# Patient Record
Sex: FEMALE | Race: WHITE | Employment: UNEMPLOYED | URBAN - METROPOLITAN AREA
[De-identification: names, ages, dates, MRNs, and addresses within clinical notes are randomized per-mention and may not be internally consistent; named-entity substitution may affect disease eponyms.]

---

## 2017-02-02 ENCOUNTER — HOSPITAL ENCOUNTER (OUTPATIENT)
Dept: WOUND CARE | Age: 62
Discharge: HOME | End: 2017-02-02
Payer: COMMERCIAL

## 2017-02-02 VITALS
RESPIRATION RATE: 16 BRPM | OXYGEN SATURATION: 97 % | TEMPERATURE: 97 F | HEART RATE: 94 BPM | SYSTOLIC BLOOD PRESSURE: 159 MMHG | DIASTOLIC BLOOD PRESSURE: 87 MMHG

## 2017-02-02 PROBLEM — L97.512 SKIN ULCER OF SECOND TOE OF RIGHT FOOT WITH FAT LAYER EXPOSED (HCC): Status: ACTIVE | Noted: 2017-02-02

## 2017-02-02 PROBLEM — M20.5X1 MALLET TOE OF RIGHT FOOT: Status: ACTIVE | Noted: 2017-02-02

## 2017-02-02 PROBLEM — E11.42 DIABETIC SENSORIMOTOR NEUROPATHY (HCC): Status: ACTIVE | Noted: 2017-02-02

## 2017-02-02 PROCEDURE — 77030011256 HC DRSG MEPILEX <16IN NO BORD MOLN -A: Performed by: PODIATRIST

## 2017-02-02 PROCEDURE — 77030020057 HC DRSG MTRX CLLGN STGN -B: Performed by: PODIATRIST

## 2017-02-02 PROCEDURE — 97597 DBRDMT OPN WND 1ST 20 CM/<: CPT

## 2017-02-02 RX ORDER — CETIRIZINE HCL 10 MG
5 TABLET ORAL
COMMUNITY

## 2017-02-02 NOTE — PROGRESS NOTES
Podiatry Consultation Note    Assessment:       Patient Active Problem List   Diagnosis Code    Skin ulcer of second toe of right foot with fat layer exposed ([facility] [address] L97.512    Diabetic sensorimotor neuropathy ([facility] [address] E11.42    Mallet toe of right foot M20.41       Plan:     Selective excisional debridement with [address] with Leticia and polymem to the distal 2nd toe right. Explained to patient once healed she will benefit from surgical intervention to correct the deformity. Patient seems to understand. Continue surgical shoe and to separator. Subjective:     I was asked  to evaluate Julia Mares for chronic diabetic ulcer 2nd toe right foot. She  is a 62 y.o. female admitted on 2017 for wound care. Allergies   Allergen Reactions    Tetracycline Hives       Current Outpatient Prescriptions   Medication Sig    cetirizine (ZYRTEC) 10 mg tablet Take 5 mg by mouth daily as needed for Allergies.  rosuvastatin (CRESTOR) 10 mg tablet Take 10 mg by mouth nightly.  insulin lispro (HUMALOG) 100 unit/mL injection by SubCUTAneous route.  insulin detemir (LEVEMIR) 100 unit/mL injection by SubCUTAneous route nightly.  lisinopril (PRINIVIL, ZESTRIL) 10 mg tablet Take  by mouth daily. No current facility-administered medications for this encounter. Past Medical History   Diagnosis Date    Diabetes ([facility] [address]     Hyperlipidemia     Hypertension     Migraine     Nausea & vomiting      Past Surgical History   Procedure Laterality Date    Hx dilation and curettage      Hx  section      Hx pelvic laparoscopy      Hx cataract removal Left      No family history on file. Social History     Social History    Marital status:      Spouse name: N/A    Number of children: N/A    Years of education: N/A     Occupational History    Not on file.      Social History Main Topics    Smoking status: Never Smoker    Smokeless tobacco: Not on file    Alcohol use No    Drug use: No    Sexual activity: Not on file     Other Topics Concern    Not on file     Social History Narrative       Physical Exam:  GENERAL: alert, cooperative, no distress, appears stated age    REVIEW OF SYSTEMS:  General: denies chronic fatigue, weight loss, fever, anemia, bruising, depression, nervousness, panic attacks  HEENT: denies ringing in ears, ear infections, dizzy spells, poor vision, glaucoma, sinus trouble, hoarseness, eye infections  GI: denies diarrhea, gas, bloating, heartburn, regurgitation, difficulty swallowing, painful swallowing, nausea, vomiting, constipation, abdominal pain, decreased appetite, blood in stools, black stools, jaundice, dark urine  Lungs: denies pneumonia, asthma, cough, SOB, hemoptysis  Heart: denies chest pain, irregular heart beat, ankle swelling, HTN  Skin: denies rashes, hives, allergic reaction  Urinary: denies UTI, kidney stones, decreased urine force and flow, urination at night, blood in urine, painful urination  Bones and Joints: admits to arthritis,   Neurologic: admits to numbness, tingling      Vitals:    02/02/17 0914   BP: 159/87   Pulse: 94   Resp: 16   Temp: 97 °F (36.1 °C)   SpO2: 97%       OBJECTIVE:    Patient is alert, well developed, well nourished, pleasant and in no apparent distress. Lower Extremity Exam:     VASCULAR EXAM:. Pedal pulses are palpable 2/4 DP 2/4 PT right and left foot. Skin temperature is warm to warm right and left foot. Digital capillary fill time is 4 seconds right and left foot. There is no edema of the right and left foot and ankle. NEUROLOGICAL EXAM:. Sensation Decrease with 5.07g monofilament wire right and left foot. Deep tendon reflexes intact and symmetrical on the right and left foot. Sensation is decreased (LOPS)    MUSCULOSKELETAL EXAM:. Muscle tone is normal.  Muscle strength of the flexor and extensor group inversion and eversion Bilateral. 5/5.      DERMATOLOGICAL EXAM:. Skin is of normal texture and turgor with  Bilateral. There is a ulcer full thickness distal aspect of the 2nd toe right foot. There subhyperkeratotic rim granulation wound bed noted. There is no evidence of asc lymphangitis, odor with minimal exudate noted. No results found for this or any previous visit (from the past 24 hour(s)). Imaging: taken in the office. DEBRIDEMENT NOTE    Selective sharp instrument debridement of slough and devitilized tissue    After the benefits/risks/SE were discussed, the patient agreed to proceed. Time out was done:   * Patient was identified by name and date of birth   * Agreement on procedure being performed was verified   * Procedure site verified and marked as necessary   * Patient was positioned for comfort   * Consent was signed and verified. Site: distal 2nd toe    Instruments used:    []  Dermal curette  [x] Blade        [] #15  [x] #10  [] Forceps  [] Tissue nippers  [] sterile scissors  [] Other     Anesthesia:    []  EMLA 2.5% cream: applied to wound beds for approximately 15minutes. []   Lidocaine 2% Topical Gel      []  Lidocaine injectable 1% with epinephrine 1:100,000    []  Lidocaine injectable 1% without epinephrine    []  Other:     [x]  None         [x] patient is insensate due to neuropathy         [] patient declines        [] allergy to anesthetic        [] tissue for debridement is either superficial, loosely adherent and/or necrotic and denervated     After satisfactory anesthesia achieved, the wound/s was/were sharply debrided necrotic, devitalized and granulation tissue down to the sub Q layer, revealing a clean and viable wound bed. Post debridement measurement was 0.6_x_0.7_x0.2_cm . Bleeding: <5mL Resolved with light focal pressure. Wounds were cleaned and irrigated with saline.      Wound care applied:   []   Hydrogell   []  Hypergel   []   Hydrofiber/Aquacel      []   Cadexomer Iodine (Iodosorb)   []  Silver Alginate    []   Medihoney:    []   Collagenase:Santyl   [] Calcium Alginate   []   Collagen:    []   Foam   []  Non-Adherent Contact Layer   []   Xeroform    []   Adaptec:   []   Hydrocolloid   []   Transparent Film    []   Promogran   [x]   Leticia   []   Promogran       []  Antibiotic ointment/cream  []   Wound VAC   []   Other (see below)     Other:     []   Dry Gauze and Roll Gauze    []   Foam and Roll Gauze    []   Dry Gauze    []    Bordered gauze:     []   Secure with Tape    [x]   Other  mepilex    []   Compression Wrap:          []   Unna Boot    []Multi-Layer    []Tubular Bandages       Becka-Ulcer Care    []   Cream     []   Lotion     []   Ointment     []   Barrier     []   Other:       The patient tolerated the procedure well with no complications. The patient left the exam room in satisfactory and stable condition.      KENDAL Garner DPM  [facility]  [phone]   2/2/2017, 10:45 AM

## 2017-02-02 NOTE — WOUND CARE
02/02/17 0912   Wound Toe Right;Distal   Date First Assessed: 02/02/17   POA: Yes  Wound Type: Diabetic  Location: Toe  Wound Description (Optional): 2nd toe  Orientation: Right;Distal   DRESSING STATUS Removed   DRESSING TYPE Other (Comment)  (Paper tape)   Non-Pressure Ulcer Partial thickness (epider/derm)   Wound Length (cm) 0.5 cm  (No changes in wound size or depth post debridement.)   Wound Width (cm) 0.6 cm  (No changes in wound size or depth post debridement.)   Wound Depth (cm) 0.3  (No changes in wound size or depth post debridement.)   Wound Surface area (cm^3) 0.09 cm^2   Condition of Base Pink;Slough   Condition of Edges Calloused   Tissue Type Pink;Yellow   Tissue Type Percent Pink 80   Tissue Type Percent Yellow 20   Drainage Amount  Scant   Drainage Color Serosanguinous   Wound Odor None   Periwound Skin Condition Calloused   Cleansing and Cleansing Agents  Dermal wound cleanser   Dressing Type Applied Other (Comment)  (Leticia, Mepilex foam and hypafix tape. )   Procedure Tolerated Well     Please make note the measuring tape in the picture above should state Right Second Toe.

## 2017-02-07 ENCOUNTER — HOSPITAL ENCOUNTER (OUTPATIENT)
Dept: LAB | Age: 62
Discharge: HOME | End: 2017-02-07

## 2017-02-07 PROCEDURE — 99001 SPECIMEN HANDLING PT-LAB: CPT | Performed by: INTERNAL MEDICINE

## 2017-02-16 ENCOUNTER — HOSPITAL ENCOUNTER (OUTPATIENT)
Dept: WOUND CARE | Age: 62
Discharge: HOME | End: 2017-02-16
Payer: COMMERCIAL

## 2017-02-16 VITALS
SYSTOLIC BLOOD PRESSURE: 171 MMHG | RESPIRATION RATE: 16 BRPM | HEART RATE: 93 BPM | TEMPERATURE: 97 F | OXYGEN SATURATION: 98 % | DIASTOLIC BLOOD PRESSURE: 94 MMHG

## 2017-02-16 PROCEDURE — 77030020057 HC DRSG MTRX CLLGN STGN -B: Performed by: PODIATRIST

## 2017-02-16 PROCEDURE — 77030011256 HC DRSG MEPILEX <16IN NO BORD MOLN -A: Performed by: PODIATRIST

## 2017-02-16 PROCEDURE — 97597 DBRDMT OPN WND 1ST 20 CM/<: CPT

## 2017-02-16 RX ORDER — CEPHALEXIN 500 MG/1
500 CAPSULE ORAL 3 TIMES DAILY
COMMUNITY
End: 2017-03-02

## 2017-02-16 RX ORDER — BISMUTH SUBSALICYLATE 262 MG
1 TABLET,CHEWABLE ORAL DAILY
COMMUNITY

## 2017-02-16 RX ORDER — CHOLECALCIFEROL (VITAMIN D3) 125 MCG
2000 CAPSULE ORAL DAILY
COMMUNITY

## 2017-02-16 RX ORDER — GLUCOSAMINE/CHONDR SU A SOD 750-600 MG
1000 TABLET ORAL DAILY
COMMUNITY

## 2017-02-16 NOTE — WOUND CARE
02/16/17 1016   Wound Toe Right;Distal   Date First Assessed: 02/02/17   POA: Yes  Wound Type: Diabetic  Location: Toe  Wound Description (Optional): 2nd toe  Orientation: Right;Distal   DRESSING STATUS Removed   DRESSING TYPE Other (Comment)  (Foam/Fabric tape)   Non-Pressure Ulcer Partial thickness (epider/derm)   Wound Length (cm) 0.5 cm   Wound Width (cm) 0.4 cm   Wound Depth (cm) 0.3   Wound Surface area (cm^3) 0.06 cm^2   Condition of Base Pink   Condition of Edges Calloused   Tissue Type Pink   Tissue Type Percent Pink 100   Drainage Amount  Scant   Drainage Color Serosanguinous   Wound Odor None   Periwound Skin Condition Calloused   Cleansing and Cleansing Agents  Dermal wound cleanser   Dressing Type Applied Other (Comment)  (Leticia/Mepilex Foam/Hypafix Tape)   Procedure Tolerated Well

## 2017-02-16 NOTE — PROGRESS NOTES
Progress and Debridement Note    Patient: Jeremiah Booth MRN: [id number]  SSN: [id number]    YOB: 1955  Age: 65 y.o. Sex: female      Assessment:     Active Problems:    Skin ulcer of second toe of right foot with fat layer exposed ([facility] [address] (2/2/2017)      Diabetic sensorimotor neuropathy ([facility] [address] (2/2/2017)      Mallet toe of right foot (2/2/2017)        Plan:      Selective excisional debridement with continued LWC and offloading 2nd toe right foot. She is to continue surgical shoe. Explained to patient the longer a wound stays open the greater a chance of bone infection. Patient states she understands. She will benefit from surgical intervention to decrease pressure on the distal aspect of the digit. Subjective:     Julia A Mares for chronic diabetic ulcer 2nd toe right foot. She has been changing the dressing as instructed and using the surgical shoe right foot. She is currently on Keflex secondary to post op hand surgery    Objective:     Patient Vitals for the past 24 hrs:   Temp Pulse Resp BP SpO2   02/16/17 1015 97 °F (36.1 °C) 93 16 (!) 171/94 98 %       Physical Exam:     General Exam  alert, cooperative, no distress, appears stated age    REVIEW OF SYSTEMS:  No changes    Lower Extremity Exam:      VASCULAR EXAM:. Pedal pulses are palpable 2/4 DP 2/4 PT right and left foot. Skin temperature is warm to warm right and left foot. Digital capillary fill time is 4 seconds right and left foot. There is no edema of the right and left foot and ankle.      NEUROLOGICAL EXAM:. Sensation Decrease with 5.07g monofilament wire right and left foot. Deep tendon reflexes intact and symmetrical on the right and left foot.  Sensation is decreased (LOPS)     MUSCULOSKELETAL EXAM:. Muscle tone is normal. Muscle strength of the flexor and extensor group inversion and eversion Bilateral. 5/5.      DERMATOLOGICAL EXAM:. Skin is of normal texture and turgor with Bilateral. There is a ulcer full thickness distal aspect of the 2nd toe right foot. There is subhyperkeratotic rim granulation wound bed noted with improvement in measurements. There is no SOI    Wound Eye Right (Active)   Number of days:217       Wound Toe Right;Distal (Active)   DRESSING STATUS Removed 2/16/2017 10:16 AM   DRESSING TYPE Other (Comment) 2/16/2017 10:16 AM   Non-Pressure Ulcer Partial thickness (epider/derm) 2/16/2017 10:16 AM   Wound Length (cm) 0.5 cm 2/16/2017 10:26 AM   Wound Width (cm) 0.5 cm 2/16/2017 10:26 AM   Wound Depth (cm) 0.1 2/16/2017 10:26 AM   Wound Surface area (cm^3) 0.02 cm^2 2/16/2017 10:26 AM   Condition of [place] 2/16/2017 10:16 AM   Condition of Edges Calloused 2/16/2017 10:16 AM   Tissue Type Pink 2/16/2017 10:16 AM   Tissue Type Percent Pink 100 2/16/2017 10:16 AM   Tissue Type Percent Yellow 20 2/2/2017  9:12 AM   Drainage Amount  Scant 2/16/2017 10:16 AM   Drainage Color Serosanguinous 2/16/2017 10:16 AM   Wound Odor None 2/16/2017 10:16 AM   Periwound Skin Condition Calloused 2/16/2017 10:16 AM   Cleansing and Cleansing Agents  Dermal wound cleanser 2/16/2017 10:16 AM   Dressing Type Applied Other (Comment) 2/16/2017 10:16 AM   Procedure Tolerated Well 2/16/2017 10:16 AM   Number of days:14              Lab/Data Review:  No results found for this or any previous visit (from the past 24 hour(s)). none      PROCEDURE    Selective sharp instrument excisional debridement of slough and devitilized tissue    After the benefits/risks/SE were discussed, the patient agreed to proceed. Time out was done:   * Patient was identified by name and date of birth   * Agreement on procedure being performed was verified   * Procedure site verified and marked as necessary   * Patient was positioned for comfort   * Consent was signed and verified.      Site: 2nd toe right foot    Instruments used:    []  Dermal curette  [x] Blade        [x] #15  [] #10  [] Forceps  [] Tissue nippers  [] sterile scissors  [] Other Anesthesia:    []  EMLA 2.5% cream: applied to wound beds for approximately 15minutes. []   Lidocaine 2% Topical Gel      []  Lidocaine injectable 1% with epinephrine 1:100,000    []  Lidocaine injectable 1% without epinephrine    []  Other:     [x]  None         [x] patient is insensate due to neuropathy         [] patient declines        [] allergy to anesthetic        [] tissue for debridement is either superficial, loosely adherent and/or necrotic and denervated     After satisfactory anesthesia achieved, the wound/s was/were sharply debrided necrotic, devitalized and granulation tissue down to the sub Q layer, revealing a clean and viable wound bed. Post debridement measurement was 0.7_x_0.5_x_0.1_cm . Bleeding: <5mL Resolved with light focal pressure. Wounds were cleaned and irrigated with saline. Wound care applied:   []   Hydrogell   []  Hypergel   []   Hydrofiber/Aquacel      []   Cadexomer Iodine (Iodosorb)   []  Silver Alginate    []   Medihoney:    []   Collagenase:Santyl   []  Calcium Alginate   [x]   Collagen:Leticia    [x]   Foam   []  Non-Adherent Contact Layer   []   Xeroform    []   Adaptec:   []   Hydrocolloid   []   Transparent Film    []  Antibiotic ointment/cream     []   Other (see below)     Other:     []   Dry Gauze and Roll Gauze    []   Foam and Roll Gauze    []   Dry Gauze    []   Bordered gauze:     []   Secure with Tape    [x]   Bordered foam       []   Other      []   Compression Wrap:          []   Unna Boot    []Multi-Layer    []Tubular Bandages       Becka-Ulcer Care    []   Cream     []   Lotion     []   Ointment     []   Barrier     []   Other:       The patient tolerated the procedure well with no complications. The patient left the exam room in satisfactory and stable condition.      Signed By: Alejandra Sunshine DPM     February 16, 2017 12:35 PM

## 2017-03-02 ENCOUNTER — HOSPITAL ENCOUNTER (OUTPATIENT)
Dept: WOUND CARE | Age: 62
Discharge: HOME | End: 2017-03-02
Payer: COMMERCIAL

## 2017-03-02 VITALS
HEART RATE: 95 BPM | RESPIRATION RATE: 17 BRPM | SYSTOLIC BLOOD PRESSURE: 153 MMHG | DIASTOLIC BLOOD PRESSURE: 85 MMHG | OXYGEN SATURATION: 97 % | TEMPERATURE: 97.5 F

## 2017-03-02 PROCEDURE — 97597 DBRDMT OPN WND 1ST 20 CM/<: CPT

## 2017-03-02 RX ORDER — AMOXICILLIN AND CLAVULANATE POTASSIUM 875; 125 MG/1; MG/1
1 TABLET, FILM COATED ORAL EVERY 12 HOURS
COMMUNITY
End: 2019-08-13 | Stop reason: ALTCHOICE

## 2017-03-02 NOTE — WOUND CARE
03/02/17 1445   Wound Toe Right;Distal   Date First Assessed: 02/02/17   POA: Yes  Wound Type: Diabetic  Location: Toe  Wound Description (Optional): 2nd toe  Orientation: Right;Distal   DRESSING STATUS Removed   DRESSING TYPE Other (Comment)  (Foam and paper tape.)   Non-Pressure Ulcer Partial thickness (epider/derm)   Wound Length (cm) 0.5 cm  (Measured the callused area. )   Wound Width (cm) 0.6 cm  (Measured the callused area. )   Wound Depth (cm) (Unable to obtain depth due to area being callused over.)   Condition of Edges Calloused   Epithelialization (%) 100  (Wound is clinically closed post debridement of callus.)   Drainage Amount  None   Wound Odor None   Periwound Skin Condition Calloused   Cleansing and Cleansing Agents  Dermal wound cleanser   Dressing Type Applied Other (Comment)  (PolyMem)   Procedure Tolerated Well      Wound Tracking:   Wound Location- Right distal second toe.    Week # 4  Surface Area 0

## 2017-03-02 NOTE — PROGRESS NOTES
Progress and Debridement Note    Patient: Carlitos Muir MRN: [id number]  SSN: [id number]    YOB: 1955  Age: 65 y.o. Sex: female      Assessment:     Active Problems:    Skin ulcer of second toe of right foot with fat layer exposed ([facility] [address] (2/2/2017)      Diabetic sensorimotor neuropathy ([facility] (2/2/2017)      Mallet toe of right foot (2/2/2017)        Plan:      Selective excisional debridement revealing a completely epithalized lesion. Explained to patient the ulcer will return unless the mallet toe deformity is addressed via surgical reduction, offloading with padding or insoles. Patient states she doesn't want surgery. She is to schedule an appointment int he office in 2-3 weeks        Subjective:     64 y/o diabetic female present for fu care for diabetic foot ulcer. No new complaints today    Objective:     Patient Vitals for the past 24 hrs:   Temp Pulse Resp BP SpO2   03/02/17 1441 97.5 °F (36.4 °C) 95 17 153/85 97 %       Physical Exam:     General Exam  alert, cooperative, no distress, appears stated age    REVIEW OF SYSTEMS:  No changes      VASCULAR EXAM:. Pedal pulses are palpable 2/4 DP 2/4 PT right and left foot. Skin temperature is warm to warm right and left foot. Digital capillary fill time is 4 seconds right and left foot. There is no edema of the right and left foot and ankle.       NEUROLOGICAL EXAM:. Sensation Decrease with 5.07g monofilament wire right and left foot. Deep tendon reflexes intact and symmetrical on the right and left foot. Sensation is decreased (LOPS)      MUSCULOSKELETAL EXAM:. Muscle tone is normal. Muscle strength of the flexor and extensor group inversion and eversion Bilateral. 5/5. There is semi rigid contracture of the 2nd toe right> left      DERMATOLOGICAL EXAM:. Skin is of normal texture and turgor with Bilateral. There is a ulcer partial thickness distal aspect of the 2nd toe right foot.  There is hyperkeratotic lesion noted distal 2nd digit right foot.. There is no SOI    Wound Eye Right (Active)   Number of days:231       Wound Toe Right;Distal (Active)   DRESSING STATUS Removed 3/2/2017  2:45 PM   DRESSING TYPE Other (Comment) 3/2/2017  2:45 PM   Non-Pressure Ulcer Partial thickness (epider/derm) 3/2/2017  2:45 PM   Wound Length (cm) 0.5 cm 3/2/2017  2:45 PM   Wound Width (cm) 0.6 cm 3/2/2017  2:45 PM   Wound Depth (cm) 0.3 2/2/2017  9:12 AM   Wound Surface area (cm^3) 0.09 cm^2 2/2/2017  9:12 AM   Condition of Base Pink;Slough 2/2/2017  9:12 AM   Condition of Edges Calloused 3/2/2017  2:45 PM   Epithelialization (%) 100 3/2/2017  2:45 PM   Tissue Type Pink;Yellow 2/2/2017  9:12 AM   Tissue Type Percent Pink 80 2/2/2017  9:12 AM   Tissue Type Percent Yellow 20 2/2/2017  9:12 AM   Drainage Amount  None 3/2/2017  2:45 PM   Drainage Color Serosanguinous 2/2/2017  9:12 AM   Wound Odor None 3/2/2017  2:45 PM   Periwound Skin Condition Calloused 3/2/2017  2:45 PM   Cleansing and Cleansing Agents  Dermal wound cleanser 3/2/2017  2:45 PM   Dressing Type Applied Other (Comment) 3/2/2017  2:45 PM   Procedure Tolerated Well 3/2/2017  2:45 PM   Number of days:28              Lab/Data Review:  No results found for this or any previous visit (from the past 24 hour(s)). none      PROCEDURE    Selective sharp instrument excisional debridement of slough and devitilized tissue    After the benefits/risks/SE were discussed, the patient agreed to proceed. Time out was done:   * Patient was identified by name and date of birth   * Agreement on procedure being performed was verified   * Procedure site verified and marked as necessary   * Patient was positioned for comfort   * Consent was signed and verified. Site: distal 2nd toe right foot    Instruments used:    []  Dermal curette  [x] Blade        [] #15  [x] #10  [] Forceps  [] Tissue nippers  [] sterile scissors  [] Other     Anesthesia:    []  EMLA 2.5% cream: applied to wound beds for approximately 15minutes. []   Lidocaine 2% Topical Gel      []  Lidocaine injectable 1% with epinephrine 1:100,000    []  Lidocaine injectable 1% without epinephrine    []  Other:     [x]  None         [x] patient is insensate due to neuropathy         [] patient declines        [] allergy to anesthetic        [] tissue for debridement is either superficial, loosely adherent and/or necrotic and denervated     After satisfactory anesthesia achieved, the wound/s was/were sharply debrided necrotic, devitalized and granulation tissue down to the epithial layer, revealing a clean and viable wound bed. Post debridement measurement was 0.0_x_0.0_x_0.0_cm . Bleeding: <5mL Resolved with light focal pressure. Wounds were cleaned and irrigated with saline. Wound care applied:   []   Hydrogell   []  Hypergel   []   Hydrofiber/Aquacel      []   Cadexomer Iodine (Iodosorb)   []  Silver Alginate    []   Medihoney:    []   Collagenase:Santyl   []  Calcium Alginate   []   Collagen:    []   Foam   []  Non-Adherent Contact Layer   []   Xeroform    []   Adaptec:   []   Hydrocolloid   []   Transparent Film    []  Antibiotic ointment/cream     []   Other (see below)     Other:     []   Dry Gauze and Roll Gauze    []   Foam and Roll Gauze    []   Dry Gauze    []   Bordered gauze:     []   Secure with Tape    []   Bordered foam       [x]   Other  Poly Mem to protect the distal toe    []   Compression Wrap:          []   Unna Boot    []Multi-Layer    []Tubular Bandages       Becka-Ulcer Care    []   Cream     []   Lotion     []   Ointment     []   Barrier     []   Other:       The patient tolerated the procedure well with no complications. The patient left the exam room in satisfactory and stable condition.      Signed By: Jazzmine Ruiz DPM     March 2, 2017 3:22 PM

## 2018-07-06 ENCOUNTER — HOSPITAL ENCOUNTER (OUTPATIENT)
Dept: VASCULAR SURGERY | Age: 63
Discharge: HOME | End: 2018-07-06
Attending: SINGLE SPECIALTY
Payer: COMMERCIAL

## 2018-07-06 DIAGNOSIS — M86.9 OSTEOMYELITIS OF LEFT FOOT (HCC): ICD-10-CM

## 2018-07-06 PROCEDURE — 93922 UPR/L XTREMITY ART 2 LEVELS: CPT

## 2018-07-08 LAB
LEFT ABI: 1.26
LEFT ANTERIOR TIBIAL: 167 MMHG
LEFT ARM BP: 140 MMHG
LEFT ATA BP LEVEL: NORMAL
LEFT POSTERIOR TIBIAL: 177 MMHG
LEFT TBI: 0.78
LEFT TOE PRESSURE: 109 MMHG
RIGHT ABI: 1.44
RIGHT ANTERIOR TIBIAL: 164 MMHG
RIGHT ARM BP: 138 MMHG
RIGHT ATA BP LEVEL: NORMAL
RIGHT POSTERIOR TIBIAL: 201 MMHG
RIGHT TBI: 0.6
RIGHT TOE PRESSURE: 84 MMHG

## 2019-08-13 ENCOUNTER — HOSPITAL ENCOUNTER (EMERGENCY)
Age: 64
Discharge: HOME | End: 2019-08-14
Attending: EMERGENCY MEDICINE
Payer: COMMERCIAL

## 2019-08-13 VITALS
TEMPERATURE: 97.2 F | DIASTOLIC BLOOD PRESSURE: 97 MMHG | RESPIRATION RATE: 18 BRPM | SYSTOLIC BLOOD PRESSURE: 129 MMHG | WEIGHT: 145 LBS | HEIGHT: 68 IN | OXYGEN SATURATION: 99 % | BODY MASS INDEX: 21.98 KG/M2 | HEART RATE: 87 BPM

## 2019-08-13 DIAGNOSIS — R03.0 ELEVATED BLOOD PRESSURE READING: ICD-10-CM

## 2019-08-13 DIAGNOSIS — E16.2 HYPOGLYCEMIA: Primary | ICD-10-CM

## 2019-08-13 LAB
GLUCOSE BLD STRIP.AUTO-MCNC: 115 MG/DL (ref 70–110)
GLUCOSE BLD STRIP.AUTO-MCNC: 187 MG/DL (ref 70–110)

## 2019-08-13 PROCEDURE — 82962 GLUCOSE BLOOD TEST: CPT

## 2019-08-13 PROCEDURE — 99285 EMERGENCY DEPT VISIT HI MDM: CPT

## 2019-08-14 NOTE — ED TRIAGE NOTES
A&O patient arrived via EMS after call for hypoglycemia. Initial BG 35, EMS administered 1 amp D50. Repeat .

## 2019-08-14 NOTE — DISCHARGE INSTRUCTIONS
Patient Education        Learning About Low Blood Sugar (Hypoglycemia) in Diabetes  What is low blood sugar (hypoglycemia)? Hypoglycemia means that your blood sugar is low and your body (especially your brain) is not getting enough fuel. If you have diabetes, your blood sugar can go too low if you take too much of some diabetes medicines. It can also go too low if you miss a meal. And it can happen if you exercise too hard without eating enough food. Some medicines used to treat other health problems can cause low blood sugar too. What are the symptoms? Symptoms of low blood sugar can start quickly. It may take just 10 to 15 minutes. If you have had diabetes for many years, you may not realize that your blood sugar is low until it drops very low. · If your blood sugar level drops below 70 (mild low blood sugar), you may feel tired, anxious, dizzy, weak, shaky, or sweaty. You may have a fast heartbeat or blurry vision. · If your blood sugar level continues to drop (usually below 40), your behavior may change. You may feel more irritable. You may find it hard to concentrate or talk. And you may feel unsteady when you stand or walk. You may become too weak or confused to eat something with sugar to raise your blood sugar level. · If your blood sugar level drops very low (usually below 20), you may pass out (lose consciousness). Or you may have a seizure or stroke. If you have symptoms of severe low blood sugar, you need to get medical care right away. If you had a low blood sugar level during the night, you may wake up tired or with a headache. Or you may sweat so much during the night that your pajamas or sheets are damp when you wake up. How is low blood sugar treated? You can treat low blood sugar by eating or drinking something that has 15 grams of carbohydrate. These should be quick-sugar foods.  Check your blood sugar level again 15 minutes after having a quick-sugar food to make sure your level is getting back to your target range. Here are examples of quick-sugar foods that have 15 grams of carbohydrate:  · 3 to 4 glucose tablets  · 1 tube of glucose gel  · Hard candy (such as 3 Jolly Ranchers or 5 to 7 Life Savers)  · 1 tablespoon honey  · 2 tablespoons of raisins  · ½ cup to ¾ cup (4 to 6 ounces) of fruit juice or regular (not diet) soda  · 1 tablespoon of sugar  · 1 cup of fat-free milk  If you have problems with severe low blood sugar, someone else may have to give you a shot of glucagon. This is a hormone that raises blood sugar levels quickly. How can you prevent low blood sugar? You can take steps to prevent low blood sugar. · Follow your treatment plan. Take your insulin or other diabetes medicine exactly as your doctor prescribed it. Talk with your doctor if you're having low blood sugar often. Your medicine may need to be adjusted if it's causing your low blood sugar. · Check your blood sugar levels often. This helps you find early changes before an emergency happens. · Keep a quick-sugar food with you in case your blood sugar level drops low. · Eat small meals more often so that you don't get too hungry between meals. Don't skip meals. · Balance extra exercise with eating more. Check your blood sugar and learn how it changes after exercise. If your blood sugar stays at a normal level, you may not need to eat after you exercise. · Limit how much alcohol you drink. Alcohol can make low blood sugar go even lower. Don't drink alcohol if you have problems recognizing the early signs of low blood sugar. · Keep a diary of your symptoms. This helps you learn when changes in your body may signal low blood sugar. And keep track of how often you have low blood sugar, including when you last ate and what you ate. This will help you learn what causes your blood sugar to drop. · Learn about diabetes and low blood sugar.  Support groups or a diabetes education center can help you understand how medicines, diet, and exercise affect your blood sugar levels. Since low blood sugar levels can quickly become an emergency, be sure to wear medical alert jewelry, such as a medical alert bracelet. This is to let people know you have diabetes so they can get help for you. You can buy this at most drugstores. And make sure your family, friends, and coworkers know the symptoms of low blood sugar. Teach them what to do to get your sugar level up. Follow-up care is a key part of your treatment and safety. Be sure to make and go to all appointments, and call your doctor if you are having problems. It's also a good idea to know your test results and keep a list of the medicines you take. Where can you learn more? Go to http://eladia-luis antonio.info/. Enter P737 in the search box to learn more about \"Learning About Low Blood Sugar (Hypoglycemia) in Diabetes. \"  Current as of: July 25, 2018  Content Version: 12.1  © 0108-3105 [employer]. Care instructions adapted under license by [employer] (which disclaims liability or warranty for this information). If you have questions about a medical condition or this instruction, always ask your healthcare professional. [address] any warranty or liability for your use of this information.

## 2019-08-14 NOTE — ED PROVIDER NOTES
EMERGENCY DEPARTMENT HISTORY AND PHYSICAL EXAM    Date: 8/13/2019  Patient Name: Pramod Jimenez    History of Presenting Illness     Chief Complaint   Patient presents with    Low Blood Sugar         History Provided By: Patient and EMS    Chief Complaint: hypoglycemia  Duration: 1 Days  Timing:  Acute  Location: global  Quality: n/a  Severity: N/A  Modifying Factors: none  Associated Symptoms: denies any other associated signs or symptoms      HPI: Pramod Jimenez is a 62 y.o. female with a PMH of DM, HTN, HLD, migraines who presents to the ER via EMS after being found by family member unresponsive at home tonight. Patient is a known type I diabetic. EMS reports her initial blood glucose was 35. EMS established an IV and administered 25 g of D50 with moderate improvement in patient's mental status. Upon arrival to the ER, patient has no complaints at this time. Patient reports she has been having trouble controlling her blood glucose lately. She denied any fevers, chills, cough, congestion, urinary symptoms and has no other symptoms or complaints. Patient reports she normally has a sugar around 200. PCP: Iveth Parkinson MD    Current Outpatient Medications   Medication Sig Dispense Refill    cholecalciferol, vitamin D3, (VITAMIN D3) 2,000 unit tab Take 2,000 Int'l Units by mouth daily.  Biotin 2,500 mcg cap Take 1,000 mg by mouth daily.  multivitamin (ONE A DAY) tablet Take 1 Tab by mouth daily.  cetirizine (ZYRTEC) 10 mg tablet Take 5 mg by mouth daily as needed for Allergies.  rosuvastatin (CRESTOR) 10 mg tablet Take 10 mg by mouth nightly.  insulin lispro (HUMALOG) 100 unit/mL injection by SubCUTAneous route.  insulin detemir (LEVEMIR) 100 unit/mL injection by SubCUTAneous route nightly.          Past History     Past Medical History:  Past Medical History:   Diagnosis Date    Diabetes ([facility] [address]     Hyperlipidemia     Hypertension     Migraine     Nausea & vomiting Past Surgical History:  Past Surgical History:   Procedure Laterality Date    HX CATARACT REMOVAL Left     HX  SECTION      HX DILATION AND CURETTAGE      HX PELVIC LAPAROSCOPY         Family History:  History reviewed. No pertinent family history. Social History:  Social History     Tobacco Use    Smoking status: Never Smoker    Smokeless tobacco: Never Used   Substance Use Topics    Alcohol use: No    Drug use: No       Allergies: Allergies   Allergen Reactions    Tetracycline Hives         Review of Systems   Review of Systems   Constitutional: Negative for chills, fatigue and fever. Hypoglycemia     HENT: Negative. Negative for sore throat. Eyes: Negative. Respiratory: Negative for cough and shortness of breath. Cardiovascular: Negative for chest pain and palpitations. Gastrointestinal: Negative for abdominal pain, nausea and vomiting. Genitourinary: Positive for frequency. Negative for dysuria. Musculoskeletal: Negative. Skin: Negative. Neurological: Negative for dizziness, weakness, light-headedness and headaches. Psychiatric/Behavioral: Negative. All other systems reviewed and are negative. Physical Exam     Vitals:    19 2243 19 2302 19 2345   BP: (!) 196/93  (!) 129/97   Pulse: 90  87   Resp: 22  18   Temp:  97.2 °F (36.2 °C)    SpO2: 99%  99%   Weight: 65.8 kg (145 lb)     Height: 5' 8\" (1.727 m)       Physical Exam   Constitutional: She is oriented to person, place, and time. She appears well-developed and well-nourished. No distress. Cool, clammy skin   HENT:   Head: Normocephalic and atraumatic. Mouth/Throat: Oropharynx is clear and moist.   Eyes: Conjunctivae are normal. No scleral icterus. Neck: Neck supple. No JVD present. No tracheal deviation present. Cardiovascular: Normal rate, regular rhythm and normal heart sounds. No murmur heard.   Pulmonary/Chest: Effort normal and breath sounds normal. No respiratory distress. She has no wheezes. She has no rales. She exhibits no tenderness. Abdominal: Soft. She exhibits no distension. There is no tenderness. There is no rebound and no guarding. Musculoskeletal: Normal range of motion. Neurological: She is alert and oriented to person, place, and time. She has normal strength. Gait normal. GCS eye subscore is 4. GCS verbal subscore is 5. GCS motor subscore is 6. Skin: Skin is warm and dry. She is not diaphoretic. Psychiatric: She has a normal mood and affect. Nursing note and vitals reviewed. Diagnostic Study Results     Labs -     Recent Results (from the past 12 hour(s))   GLUCOSE, POC    Collection Time: 08/13/19 10:36 PM   Result Value Ref Range    Glucose (POC) 115 (H) 70 - 110 mg/dL   GLUCOSE, POC    Collection Time: 08/13/19 11:46 PM   Result Value Ref Range    Glucose (POC) 187 (H) 70 - 110 mg/dL       Radiologic Studies -   No orders to display     CT Results  (Last 48 hours)    None        CXR Results  (Last 48 hours)    None            Medical Decision Making   I am the first provider for this patient. I reviewed the vital signs, available nursing notes, past medical history, past surgical history, family history and social history. Vital Signs-Reviewed the patient's vital signs. Records Reviewed: Nursing Notes, Old Medical Records and Previous Laboratory Studies     605 PM  31-year-old female who presents to the ER via EMS from her home after being found unresponsive by her family members. EMS reports initial blood sugar was 35. IV was established and patient was administered 25 g of D50. Moderate improvement in her mental status noted. Repeat blood sugar here is 113. Patient tolerating p.o. intake at this time. We will continue to monitor and recheck blood glucose. Nicole Junior PA-C     11:58 PM  Patient resting at this time with family members at bedside. Patient has no further complaints.   Blood glucose noted to improve after recheck. Discussed strict return precautions. Will have patient follow-up with her primary care provider. Patient stable for discharge. All questions answered and patient in agreement with plan of care. Will plan for discharge. Noa Orozco PA-C    Disposition:  discharged    DISCHARGE NOTE:       Care plan outlined and precautions discussed. Patient has no new complaints, changes, or physical findings. Results of labs were reviewed with the patient. All medications were reviewed with the patient; will d/c home with n/a. All of pt's questions and concerns were addressed. Patient was instructed and agrees to follow up with pcp, as well as to return to the ED upon further deterioration. Patient is ready to go home. Follow-up Information     Follow up With Specialties Details Why [address] [facility] EMERGENCY DEPT Emergency Medicine  If symptoms worsen [address] [place] [address]    Mary Kay Edwards MD Family Practice Call in 1 day As needed for ER follow up [address] [facility] [id number] [phone]            Current Discharge Medication List      CONTINUE these medications which have NOT CHANGED    Details   cholecalciferol, vitamin D3, (VITAMIN D3) 2,000 unit tab Take 2,000 Int'l Units by mouth daily. Biotin 2,500 mcg cap Take 1,000 mg by mouth daily. multivitamin (ONE A DAY) tablet Take 1 Tab by mouth daily. cetirizine (ZYRTEC) 10 mg tablet Take 5 mg by mouth daily as needed for Allergies. rosuvastatin (CRESTOR) 10 mg tablet Take 10 mg by mouth nightly. insulin lispro (HUMALOG) 100 unit/mL injection by SubCUTAneous route. insulin detemir (LEVEMIR) 100 unit/mL injection by SubCUTAneous route nightly. Provider Notes (Medical Decision Making):     Procedures:  Procedures        Diagnosis     Clinical Impression:   1. Hypoglycemia    2.  Elevated blood pressure reading